# Patient Record
Sex: FEMALE | Race: WHITE | NOT HISPANIC OR LATINO | Employment: OTHER | URBAN - METROPOLITAN AREA
[De-identification: names, ages, dates, MRNs, and addresses within clinical notes are randomized per-mention and may not be internally consistent; named-entity substitution may affect disease eponyms.]

---

## 2019-04-09 ENCOUNTER — APPOINTMENT (OUTPATIENT)
Dept: WOUND CARE | Facility: HOSPITAL | Age: 74
End: 2019-04-09
Payer: COMMERCIAL

## 2019-04-09 PROCEDURE — 99213 OFFICE O/P EST LOW 20 MIN: CPT

## 2019-04-09 PROCEDURE — 97597 DBRDMT OPN WND 1ST 20 CM/<: CPT

## 2019-04-23 ENCOUNTER — APPOINTMENT (OUTPATIENT)
Dept: WOUND CARE | Facility: HOSPITAL | Age: 74
End: 2019-04-23
Payer: COMMERCIAL

## 2019-04-23 PROCEDURE — 99212 OFFICE O/P EST SF 10 MIN: CPT

## 2019-04-23 PROCEDURE — 97597 DBRDMT OPN WND 1ST 20 CM/<: CPT

## 2019-05-07 ENCOUNTER — APPOINTMENT (OUTPATIENT)
Dept: WOUND CARE | Facility: HOSPITAL | Age: 74
End: 2019-05-07
Payer: COMMERCIAL

## 2019-05-07 PROCEDURE — 99212 OFFICE O/P EST SF 10 MIN: CPT

## 2020-09-10 ENCOUNTER — TELEPHONE (OUTPATIENT)
Dept: OBGYN CLINIC | Facility: CLINIC | Age: 75
End: 2020-09-10

## 2020-09-10 ENCOUNTER — APPOINTMENT (OUTPATIENT)
Dept: RADIOLOGY | Facility: CLINIC | Age: 75
End: 2020-09-10
Payer: COMMERCIAL

## 2020-09-10 ENCOUNTER — OFFICE VISIT (OUTPATIENT)
Dept: OBGYN CLINIC | Facility: CLINIC | Age: 75
End: 2020-09-10
Payer: COMMERCIAL

## 2020-09-10 VITALS
SYSTOLIC BLOOD PRESSURE: 142 MMHG | TEMPERATURE: 98.6 F | DIASTOLIC BLOOD PRESSURE: 78 MMHG | HEIGHT: 63 IN | BODY MASS INDEX: 23.92 KG/M2 | HEART RATE: 80 BPM | WEIGHT: 135 LBS

## 2020-09-10 DIAGNOSIS — M54.12 RADICULOPATHY, CERVICAL REGION: Primary | ICD-10-CM

## 2020-09-10 DIAGNOSIS — M54.12 RADICULOPATHY, CERVICAL REGION: ICD-10-CM

## 2020-09-10 PROCEDURE — 72040 X-RAY EXAM NECK SPINE 2-3 VW: CPT

## 2020-09-10 PROCEDURE — 99204 OFFICE O/P NEW MOD 45 MIN: CPT | Performed by: FAMILY MEDICINE

## 2020-09-10 RX ORDER — SIMVASTATIN 40 MG
TABLET ORAL
COMMUNITY
Start: 2020-08-07

## 2020-09-10 RX ORDER — FLUOROMETHOLONE 0.1 %
SUSPENSION, DROPS(FINAL DOSAGE FORM)(ML) OPHTHALMIC (EYE)
COMMUNITY
Start: 2020-08-10

## 2020-09-10 RX ORDER — ATENOLOL 50 MG/1
TABLET ORAL
COMMUNITY
Start: 2020-08-07

## 2020-09-10 RX ORDER — CELECOXIB 200 MG/1
100 CAPSULE ORAL 2 TIMES DAILY
COMMUNITY
Start: 2020-09-09

## 2020-09-10 RX ORDER — PREDNISONE 10 MG/1
TABLET ORAL
Qty: 28 TABLET | Refills: 0 | Status: SHIPPED | OUTPATIENT
Start: 2020-09-10

## 2020-09-10 RX ORDER — LISINOPRIL 20 MG/1
TABLET ORAL
COMMUNITY
Start: 2020-08-07

## 2020-09-10 RX ORDER — HYDROCODONE BITARTRATE AND ACETAMINOPHEN 5; 325 MG/1; MG/1
1 TABLET ORAL EVERY 6 HOURS PRN
Qty: 15 TABLET | Refills: 0 | Status: SHIPPED | OUTPATIENT
Start: 2020-09-10

## 2020-09-10 RX ORDER — AMLODIPINE BESYLATE 2.5 MG/1
TABLET ORAL
COMMUNITY
Start: 2020-08-05

## 2020-09-10 RX ORDER — HYDROCODONE BITARTRATE AND ACETAMINOPHEN 5; 325 MG/1; MG/1
TABLET ORAL
COMMUNITY
Start: 2020-09-05

## 2020-09-10 RX ORDER — BETAMETHASONE DIPROPIONATE 0.5 MG/G
LOTION TOPICAL
COMMUNITY
Start: 2020-09-02

## 2020-09-10 NOTE — PATIENT INSTRUCTIONS
F/u 1 wk  Consider PT if no improvement  Begin prednisone taper  Most likely Cervical radiculopathy  Possible psoriatic arthritis but patient had no improvement with injection  (told she has bone on bone) Maybe need a few more days for steroids to work? Hold Celebrex and methylprednisolone  Inform Rheumatologist of treatment  Patient will bring XR to next appt  She will schedule with Ortho down near to home

## 2020-09-10 NOTE — PROGRESS NOTES
[facility] [address] [facility]  [address]  [id number] [id number] [phone]      Chief Complaint:  Chief Complaint   Patient presents with    Left Elbow - Pain       Vitals:  /78 (BP Location: Left arm, Patient Position: Sitting, Cuff Size: Standard)   Pulse 80   Temp 98 6 °F (37 °C) (Tympanic)   Ht 5' 3 25" (1 607 m)   Wt 61 2 kg (135 lb)   BMI 23 73 kg/m²     The following portions of the patient's history were reviewed and updated as appropriate: allergies, current medications, past family history, past medical history, past social history, past surgical history, and problem list       Subjective:   Patient ID: Carey Scott is a 66 y o  female  Here c/o L elbow pain  Pain started last Thursday- woke up with pain  Similar symptoms 1 year ago- given meloxicam Dr Neo Sanz- Rheum- referred her here  Symptoms improved  Started again  Went to ER on Sat- at [address]- XR, told XR showed arthritis  The night before she moved a bookcase  No other injury  Took meloxicam 15 mg which didn't help- caused ankles to swell  Taking T#3 from ER at night  Tylenol at night  Started Celebrex yesterday  Given injection in L elbow- no help  Hx of psoriatic arthritis on steroids  Pain radiating down to the fingers      Review of Systems   Constitutional: Negative for fatigue and fever  Respiratory: Negative for shortness of breath  Cardiovascular: Negative for chest pain  Gastrointestinal: Positive for nausea  Negative for abdominal pain  Genitourinary: Negative for dysuria  Musculoskeletal: Positive for arthralgias  Skin: Negative for rash and wound  Neurological: Negative for weakness and headaches         Objective:  Back Exam     Tenderness   The patient is experiencing tenderness in the cervical     Range of Motion   Extension: normal   Flexion: normal   Lateral bend right: normal   Lateral bend left: normal   Rotation right: normal   Rotation left: normal Comments:  Pos Spurling test   Shoulder exam- mild pain with movement  Left Elbow Exam     Tenderness   The patient is experiencing tenderness in the lateral epicondyle, medial epicondyle, radial capitellar joint and radial head  Range of Motion   Extension: abnormal   Flexion: abnormal   Pronation: abnormal   Supination: abnormal     Muscle Strength   Pronation:  5 and 4/5   Supination:  4/5     Tests   Tinel's sign (cubital tunnel): negative    Comments:  Entire elbow mild TTP, pain with ROM            Physical Exam  Vitals signs and nursing note reviewed  Constitutional:       Appearance: Normal appearance  She is well-developed  HENT:      Head: Normocephalic  Mouth/Throat:      Mouth: Mucous membranes are moist    Eyes:      Extraocular Movements: Extraocular movements intact  Neck:      Musculoskeletal: Normal range of motion  Cardiovascular:      Rate and Rhythm: Normal rate and regular rhythm  Heart sounds: Normal heart sounds  Pulmonary:      Effort: Pulmonary effort is normal       Breath sounds: Normal breath sounds  Abdominal:      General: Bowel sounds are normal       Palpations: Abdomen is soft  Musculoskeletal:         General: Tenderness and signs of injury present  Skin:     General: Skin is warm and dry  Neurological:      General: No focal deficit present  Mental Status: She is alert and oriented to person, place, and time  Psychiatric:         Mood and Affect: Mood normal          Behavior: Behavior normal          Thought Content: Thought content normal          I have personally reviewed pertinent films in PACS and my interpretation is mod/severe DJD  no fx  Assessment/Plan:  Assessment/Plan   Diagnoses and all orders for this visit:    Radiculopathy, cervical region  -     XR spine cervical 2 or 3 vw injury; Future  -     predniSONE 10 mg tablet; Take 7T PO x 1 day, then take 6T PO x 1 day, and continue to decrease  by 1T until finished  Quantity-28  -     HYDROcodone-acetaminophen (NORCO) 5-325 mg per tablet; Take 1 tablet by mouth every 6 (six) hours as needed for painMax Daily Amount: 4 tablets    Other orders  -     amLODIPine (NORVASC) 2 5 mg tablet  -     atenolol (TENORMIN) 50 mg tablet  -     betamethasone dipropionate 0 05 % lotion; apply to eyelids as needed  -     celecoxib (CeleBREX) 200 mg capsule; TAKE 1 CAPSULE BY MOUTH TWICE DAILY AS NEEDED WITH FOOD  -     fluorometholone (FML) 0 1 % ophthalmic suspension; instill 1 drop into BOTH eyes ONCE A DAY  -     HYDROcodone-acetaminophen (NORCO) 5-325 mg per tablet  -     lisinopril (ZESTRIL) 20 mg tablet  -     simvastatin (ZOCOR) 40 mg tablet        Return in about 1 week (around 9/17/2020), or schedule appt closer to patients home-Meng Rocha MD

## 2020-09-17 ENCOUNTER — OFFICE VISIT (OUTPATIENT)
Dept: OBGYN CLINIC | Facility: CLINIC | Age: 75
End: 2020-09-17
Payer: COMMERCIAL

## 2020-09-17 VITALS
BODY MASS INDEX: 24.45 KG/M2 | TEMPERATURE: 97.8 F | HEIGHT: 63 IN | SYSTOLIC BLOOD PRESSURE: 138 MMHG | DIASTOLIC BLOOD PRESSURE: 82 MMHG | WEIGHT: 138 LBS

## 2020-09-17 DIAGNOSIS — M12.522: Primary | ICD-10-CM

## 2020-09-17 DIAGNOSIS — G89.29 ELBOW PAIN, CHRONIC, LEFT: ICD-10-CM

## 2020-09-17 DIAGNOSIS — M25.522 ELBOW PAIN, CHRONIC, LEFT: ICD-10-CM

## 2020-09-17 PROCEDURE — 99213 OFFICE O/P EST LOW 20 MIN: CPT | Performed by: ORTHOPAEDIC SURGERY
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
## 2020-09-17 NOTE — ASSESSMENT & PLAN NOTE
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

## 2020-10-19 ENCOUNTER — OFFICE VISIT (OUTPATIENT)
Dept: OBGYN CLINIC | Facility: CLINIC | Age: 75
End: 2020-10-19
Payer: COMMERCIAL

## 2020-10-19 VITALS
SYSTOLIC BLOOD PRESSURE: 122 MMHG | WEIGHT: 140.2 LBS | DIASTOLIC BLOOD PRESSURE: 78 MMHG | BODY MASS INDEX: 24.84 KG/M2 | HEIGHT: 63 IN

## 2020-10-19 DIAGNOSIS — M12.522: ICD-10-CM

## 2020-10-19 PROCEDURE — 99213 OFFICE O/P EST LOW 20 MIN: CPT | Performed by: ORTHOPAEDIC SURGERY

## 2020-10-20 ENCOUNTER — TELEPHONE (OUTPATIENT)
Dept: OBGYN CLINIC | Facility: HOSPITAL | Age: 75
End: 2020-10-20

## 2024-07-22 ENCOUNTER — OFFICE VISIT (OUTPATIENT)
Dept: OBGYN CLINIC | Facility: CLINIC | Age: 79
End: 2024-07-22
Payer: COMMERCIAL

## 2024-07-22 ENCOUNTER — APPOINTMENT (OUTPATIENT)
Dept: RADIOLOGY | Facility: CLINIC | Age: 79
End: 2024-07-22
Payer: COMMERCIAL

## 2024-07-22 VITALS — BODY MASS INDEX: 24.64 KG/M2 | HEIGHT: 63 IN

## 2024-07-22 DIAGNOSIS — M12.522: ICD-10-CM

## 2024-07-22 DIAGNOSIS — M12.522: Primary | ICD-10-CM

## 2024-07-22 PROCEDURE — 99204 OFFICE O/P NEW MOD 45 MIN: CPT | Performed by: ORTHOPAEDIC SURGERY

## 2024-07-22 PROCEDURE — 73080 X-RAY EXAM OF ELBOW: CPT

## 2024-07-22 RX ORDER — TRAMADOL HYDROCHLORIDE 50 MG/1
50 TABLET ORAL
COMMUNITY
Start: 2024-05-22

## 2024-07-22 RX ORDER — ATORVASTATIN CALCIUM 40 MG/1
40 TABLET, FILM COATED ORAL EVERY 24 HOURS
COMMUNITY

## 2024-07-22 RX ORDER — CHLORHEXIDINE GLUCONATE ORAL RINSE 1.2 MG/ML
15 SOLUTION DENTAL ONCE
OUTPATIENT
Start: 2024-07-22 | End: 2024-07-22

## 2024-07-22 NOTE — PROGRESS NOTES
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  She will be on antibiotics prior to the procedure.  We discussed that she will be in a splint for some time after the procedure to encourage appropriate healing.  Then she would begin some gentle range of motion.  She would have a 3 pound lifting restriction for the rest of her life.  We will need to have her obtain a medical clearance letter prior to the procedure.  The patient wishes to proceed with surgery.  Consent was obtained today.    We also discussed significant risks including infection, wound healing, triceps healing, instability of the elbow prosthetic.    Follow Up:  After Surgery    To Do Next Visit:  Post-op evaluation    General Discussions:     Osteoarthritis:  The anatomy and physiology of osteoarthritis was discussed with the patient today in the office.  Deterioration of the articular cartilage eventually leads to altered mobility at the joint, resulting in joint subluxation, osteophyte formation, cystic changes, as well as subchondral sclerosis.  Eventually, pain, limited mobility, and compensatory hypermobility at surrounding joints may develop.  While normal activity and usage of the joint may provide a painful experience to the patient, this typically does not result in damage to the limb.  Treatment options include splints to decreased joint edema, pain, and inflammation.  Therapy exercises to strengthen the surrounding musculature may relieve pain, but do not alter the overall continued development of osteoarthritis.  Oral medications, topical medications, corticosteroid injections may decrease pain and increase overall function.  Eventually, some patients may require surgical intervention.     Operative Discussions:     Standard Consent: The risks and benefits of the procedure were explained  to the patient, which include, but are not limited to: Bleeding, infection, recurrence, pain, scar, damage to tendons, damage to nerves, and damage to blood vessels, failure to give desired results and complications related to anesthesia.  These risks, along with alternative conservative treatment options, and postoperative protocols were voiced back and understood by the patient.  All questions were answered to the patient's satisfaction.  The patient agrees to comply with a standard postoperative protocol, and is willing to proceed.  Education was provided via written and auditory forms.  There were no barriers to learning. Written handouts regarding wound care, incision and scar care, and general preoperative information was provided to the patient.  Prior to surgery, the patient may be requested to stop all anti-inflammatory medications.  Prophylactic aspirin, Plavix, and Coumadin may be allowed to be continued.  Medications including vitamin E., ginkgo, and fish oil are requested to be stopped approximately one week prior to surgery.  Hypertensive medications and beta blockers, if taken, should be continued.    _____________________________________________________  CHIEF COMPLAINT:  Chief Complaint   Patient presents with    Left Elbow - Pain     Last seen 10/19/2020         SUBJECTIVE:  Paula Handley is a 78 y.o. female who presents for evaluation of her left elbow pain.  She had seen us 4 years ago where he was diagnosed with left elbow arthritis.  She did not wish to do anything for her condition at that time, but now she states that her pain has become much more severe and she wishes to discuss elbow replacement surgery.  Her symptoms prevent her from doing her ADL's.  She has a remote history of a supracondylar 10 years ago.  Radiation: None  Previous Treatments: steroid injections, NSAIDs, and Tylenol with only partial relief  Handedness: right    PAST MEDICAL HISTORY:  Past Medical History:   Diagnosis  Date    Arthritis     Hyperlipemia     Hypertension        PAST SURGICAL HISTORY:  Past Surgical History:   Procedure Laterality Date    CORNEAL TRANSPLANT         FAMILY HISTORY:  Family History   Problem Relation Age of Onset    Hypertension Mother     Cancer Mother     Hypertension Father     Cancer Sister        SOCIAL HISTORY:  Social History     Tobacco Use    Smoking status: Never    Smokeless tobacco: Never   Vaping Use    Vaping status: Never Used   Substance Use Topics    Alcohol use: Never    Drug use: Never       MEDICATIONS:    Current Outpatient Medications:     amLODIPine (NORVASC) 2.5 mg tablet, , Disp: , Rfl:     atenolol (TENORMIN) 50 mg tablet, , Disp: , Rfl:     betamethasone dipropionate 0.05 % lotion, apply to eyelids as needed, Disp: , Rfl:     celecoxib (CeleBREX) 200 mg capsule, Take 100 mg by mouth 2 (two) times a day , Disp: , Rfl:     fluorometholone (FML) 0.1 % ophthalmic suspension, instill 1 drop into BOTH eyes ONCE A DAY, Disp: , Rfl:     lisinopril (ZESTRIL) 20 mg tablet, , Disp: , Rfl:     traMADol (ULTRAM) 50 mg tablet, Take 50 mg by mouth Every 6 hours, Disp: , Rfl:     atorvastatin (LIPITOR) 40 mg tablet, Take 40 mg by mouth every 24 hours, Disp: , Rfl:     HYDROcodone-acetaminophen (NORCO) 5-325 mg per tablet, , Disp: , Rfl:     HYDROcodone-acetaminophen (NORCO) 5-325 mg per tablet, Take 1 tablet by mouth every 6 (six) hours as needed for painMax Daily Amount: 4 tablets (Patient not taking: Reported on 7/22/2024), Disp: 15 tablet, Rfl: 0    predniSONE 10 mg tablet, Take 7T PO x 1 day, then take 6T PO x 1 day, and continue to decrease  by 1T until finished. Quantity-28 (Patient not taking: Reported on 7/22/2024), Disp: 28 tablet, Rfl: 0    simvastatin (ZOCOR) 40 mg tablet, , Disp: , Rfl:     ALLERGIES:  Allergies   Allergen Reactions    Aleve [Naproxen]     Penicillins        REVIEW OF SYSTEMS:  Pertinent items are noted in HPI.  A comprehensive review of systems was  "negative.    LABS:  HgA1c:   Lab Results   Component Value Date    HGBA1C 5.5 09/23/2021     BMP:   Lab Results   Component Value Date    CALCIUM 9.9 05/29/2024    K 4.0 05/29/2024    CO2 26.6 05/29/2024    BUN 18 05/29/2024    CREATININE 1.10 05/29/2024         _____________________________________________________  PHYSICAL EXAMINATION:  Vital signs: Ht 5' 3.25\" (1.607 m)   BMI 24.64 kg/m²   General: well developed and well nourished, alert, oriented times 3, and appears comfortable  Psychiatric: Normal  HEENT: Trachea Midline, No torticollis  Cardiovascular: No discernable arrhythmia  Pulmonary: No wheezing or stridor  Abdomen: No rebound or guarding  Extremities: No peripheral edema  Skin: No masses, erythema, lacerations, fluctation, ulcerations  Neurovascular: Sensation Intact to the Median, Ulnar, Radial Nerve, Motor Intact to the Median, Ulnar, Radial Nerve, and Pulses Intact    MUSCULOSKELETAL EXAMINATION:  LEFT SIDE:  Elbow:  Crepitation palpable with all elbow ROM.  ROM:  Extension - 40 degrees; Flexion - 130 degrees.  Full pronation and supination.  Negative Karely's over the ulnar nerve.  Full composite fist.  Motor intact R/M/U/ain/pin.  No instability noted, no skin changes, intact triceps, intact biceps.    _____________________________________________________  STUDIES REVIEWED:  Images were reviewed in PACS by Dr. Christianson and demonstrate: Left Elbow X-rays - malunited supracondylar fracture with tricompartmental arthritis.      PROCEDURES PERFORMED:  Procedures  No Procedures performed today         Scribe Attestation      I,:  Marycruz Benitez PA-C am acting as a scribe while in the presence of the attending physician.:       I,:  Alec Christianson MD personally performed the services described in this documentation    as scribed in my presence.:             Scribe Attestation      I,:  Marycruz Benitez PA-C am acting as a scribe while in the presence of the attending physician.:       I,:  " Alec Christianson MD personally performed the services described in this documentation    as scribed in my presence.:

## 2024-07-30 DIAGNOSIS — Z01.818 PREOP TESTING: Primary | ICD-10-CM

## 2024-08-07 ENCOUNTER — TELEPHONE (OUTPATIENT)
Dept: ANESTHESIOLOGY | Facility: CLINIC | Age: 79
End: 2024-08-07

## 2024-08-07 DIAGNOSIS — Z01.89 ENCOUNTER FOR GERIATRIC ASSESSMENT: Primary | ICD-10-CM

## 2024-08-12 ENCOUNTER — TELEPHONE (OUTPATIENT)
Age: 79
End: 2024-08-12

## 2024-08-12 NOTE — TELEPHONE ENCOUNTER
Called patient she is keeping PAT appt as scheduled she was confused on date that she was not available.

## 2024-08-12 NOTE — TELEPHONE ENCOUNTER
Caller: Patient     Doctor: Meghan     Reason for call: Patient needs to reschedule PAT appt on 8/15. She will not be available that day  Please call patient to coordinate     Call back#: [phone]

## 2024-08-23 ENCOUNTER — TELEMEDICINE (OUTPATIENT)
Dept: ANESTHESIOLOGY | Facility: CLINIC | Age: 79
End: 2024-08-23
Payer: COMMERCIAL

## 2024-08-23 DIAGNOSIS — M12.522: Primary | ICD-10-CM

## 2024-08-23 DIAGNOSIS — I48.0 PAF (PAROXYSMAL ATRIAL FIBRILLATION) (HCC): ICD-10-CM

## 2024-08-23 DIAGNOSIS — Z01.89 ENCOUNTER FOR GERIATRIC ASSESSMENT: ICD-10-CM

## 2024-08-23 PROBLEM — I48.91 ATRIAL FIBRILLATION (HCC): Status: ACTIVE | Noted: 2024-08-23

## 2024-08-23 PROCEDURE — 99213 OFFICE O/P EST LOW 20 MIN: CPT | Performed by: NURSE PRACTITIONER

## 2024-08-23 NOTE — PROGRESS NOTES
[facility]      Reason for Visit: Pre-operative Evaluation for Risk Stratification and Optimization    Patient ID: Paula Handley is a 78 y.o. 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]   Location:  OR ROOM 02 / [facility]   Surgeons: Alec Christianson MD         Pre-operative Medical Evaluation for planned surgery  Recommendations as listed in PLAN section below      Problem List Items Addressed This Visit       Traumatic arthritis of elbow, left - Primary  Fell 4 years ago   Has been monitoring and treating elbow pain over 4 years   Now electing surgery   Seen for SO   Seen for geriatrics  Received [facility]    Started BEST   At risk for post-op JOHN - yes  At risk for post-op SSI- no   BEST   Breathing- instructed to exercise lungs prior to surgery via deep breathing   Eat- discussed increasing protein intake prior to surgery   Sleep/stress- encouraged 8-10 sleep @ night, stress reduction, avoid sick contacts and handwashing   Train- encouraged to remain active        PAF (paroxysmal atrial fibrillation) (HCC)  4 years ago right with COVID   Blood clot from afib   Stable now   No concerns                 Plan:     2. Preoperative Medication Management Review performed by PAT nursing  YES    3. Patient requires further consultation with:   No Consults Required had [facility]     4. Discharge Planning / Barriers to Discharge  none identified - patients has post discharge therapy plan in place, transportation arranged for discharge day, adequate family support at home to assist with discharge to home.        PRE-OP  "WORKSHEET DATA  Assessment of Pre-Operative Risks     SOC Quality Hard Stops:    SSI Risk:  BMI (>40) : Estimated body mass index is 21.58 kg/m² as calculated from the following:    Height as of 8/15/24: 5' 2\" (1.575 m).    Weight as of 8/15/24: 53.5 kg (118 lb). (if >than 40 then offer weight management)  -Weight management consult No    HbA1c (<8.0) :   Lab Results   Component Value Date    HGBA1C 5.5 09/23/2021    (>8.0 recommend Endocrine consult)   -endocrine consult indicated No    Tobacco use:  No     -lifestyle management consult No    SOC Anemia review:  Hgb ( >11): No results found for: \"HGB\"   -IV venofer indicated  No    JOHN risk:  GFR (>60) (Less then 45 = Nephrology consult):    Lab Results   Component Value Date    EGFR 50.9 (L) 05/29/2024    EGFR 47.79 05/29/2024    EGFR 48.81 11/07/2023      -Nephrology consult indicated No    GERIATRIC ASSESSMENT Yes  Mini-Cognitive Screen (MCI) score <2    inpatient geriatric consult No   -ordered delirium precautions on admit Yes  Depression Screen   Falls (yes within the last 6 months)   -Fall precautions ordered on admit Yes  Mini Nutritional Assessment (MNA) (score <12)   -Estimated body mass index is 21.58 kg/m² as calculated from the following:    Height as of 8/15/24: 5' 2\" (1.575 m).    Weight as of 8/15/24: 53.5 kg (118 lb).   -order inpatient nutrition evaluation No      Functional capacity:   METS (<4) 7   -<4 MET send to SOC DOC for evaluation     Carb Drinks given by SOC   No          History of Present Illness:     Paula Handley is a 78 y.o. female who presents to the office today for a preoperative screening. They are electing to undergo planned procedure with an understanding that all surgery has inherent risk. Today they present for preoperative risk assessment and recommendations for optimization in preparation for surgery.  We have reviewed their past medical/surgical/social history as listed below, their most recent labs and EKG, and labs were " also reviewed and recommendations are as below.       ROS:      Denies fevers and denies chills  Denies congestion and denies sore throat  Denies chest pain  Denies palpitations  Denies shortness of breath  Denies abdominal pain  Denies nausea, vomiting, and diarrhea  Denies any issues with their skin... example NO open wounds or sores  Denies rashes  Denies difficulty urinating  Denies any issues with their urine... example a dark color or an odor  Denies dizziness  Denies headaches  Denies confusion and denies hallucinations    Admits to being in well health today       Objective:        Fall on elbow    4 monitoring   Pain     There were no vitals taken for this visit.      The following portions of the patient's history were reviewed and updated as appropriate: allergies, current medications, past family history, past medical history, past social history, past surgical history and problem list.     Past History:       Past Medical History:   Diagnosis Date    Arthritis     Hyperlipemia     Hypertension     Past Surgical History:   Procedure Laterality Date    CARDIAC ELECTROPHYSIOLOGY MAPPING AND ABLATION      CORNEAL TRANSPLANT      FOOT FRACTURE SURGERY Right           Social History     Tobacco Use    Smoking status: Never    Smokeless tobacco: Never   Vaping Use    Vaping status: Never Used   Substance Use Topics    Alcohol use: Never    Drug use: Never     Family History   Problem Relation Age of Onset    Hypertension Mother     Cancer Mother     Hypertension Father     Cancer Sister           Allergies:     Allergies   Allergen Reactions    Aleve [Naproxen] Itching     swelling    Bactrim [Sulfamethoxazole-Trimethoprim] Vomiting    Bupropion Lightheadedness    Buspirone Diarrhea    Cefaclor Itching    Cephalexin Itching    Doxycycline Nausea Only    Nsaids Itching     Swelling     Penicillins Hives     Swelling     Sertraline Diarrhea    Venlafaxine Itching        Current Medications:     Current  Outpatient Medications   Medication Instructions    acetaminophen (TYLENOL) 650 mg, Oral, Every 6 hours PRN    amLODIPine (NORVASC) 2.5 mg tablet 2 times daily    ascorbic acid (VITAMIN C) 250 mg, Oral, Daily    atorvastatin (LIPITOR) 40 mg, Oral, Every evening    Calcium Carb-Cholecalciferol (CALCIUM 600 + D PO) Oral, Daily    Coenzyme Q10 (Co Q 10) 100 MG CAPS Oral, Daily    escitalopram (LEXAPRO) 10 mg, Oral, Daily    fluorometholone (FML) 0.1 % ophthalmic suspension instill 1 drop into BOTH eyes ONCE A DAY    furosemide (LASIX) 20 mg, Oral, Every morning    latanoprost (XALATAN) 0.005 % ophthalmic solution 1 drop, Daily at bedtime    methylPREDNISolone (MEDROL) 2 mg, Oral, Daily    Probiotic Product (PROBIOTIC DAILY PO) Oral, Daily    Stelara 45 mg, Subcutaneous, Every 3 months    traMADol (ULTRAM) 25 mg, Oral, Every 6 hours PRN    valsartan (DIOVAN) 80 mg, Oral, 2 times daily    Vitamin D, Cholecalciferol, 25 MCG (1000 UT) CAPS Oral, Daily        Pre-Op Data Reviewed:       Laboratory Results: I have personally reviewed the pertinent laboratory results/reports     EKG:I have personally reviewed pertinent reports.  . I personally reviewed and interpreted available tracings in the electronic medical record    No results found for this or any previous visit (from the past 4464 hour(s)).    Time of visit including pre-visit chart review, visit and post-visit coordination of plan and care , review of pre-surgical lab work, preparation and time spent documenting note in electronic medical record, time spent face-to-face in physical examination answering patient questions by care team 25 minutes               [facility]

## 2024-08-23 NOTE — PROGRESS NOTES
[facility]  CONSULT       Patient has the ability to take their vital signs at home NO  Allergies reviewed today   PMH reviewed today   Medications reviewed today     See Geriatric Assessment below...  Cognitive Assessment:   CAM:   TUG <15 sec:  Falls (last 6 months): no  Hand  score:  -Attempt 1:  -Attempt 2:  -Attempt 3:  Jayant Total Score: 22  PHQ- 9 Depression Scale: 0  Nutrition Assessment Score:13  METS: 7.34  Incentive Spirometry Level:   Health goals:  -What are your overall health goals? Stay exercising    -What brings you strength?     -What activities are important to you? Read, shop     As always we discussed having your BEST surgery, and BEST recovery.  Surgery goals reviewed today.      Breathing exercises   Patient was encouraged to begin lung exercises today.  This could be accomplished through deep breathing and cough exercises.     Eating/nutrition   Encouraged patient to increase oral protein intake prior to surgery.  This can be accomplished by consuming chicken, fish, tuna fish, cottage cheese, cheese, eggs, Greek yogurt, and protein shakes as needed.  I encouraged use of protein shakes such ENLIVE.  I also recommended making your own protein shakes with protein powder.   Sleep/Stress management  Patient was encouraged to rest their body prior to surgery.  Encouraged attempting to get 8 hours of sleep at night.  Avoid stress.  Avoid sick contacts.  Encouraged to find a relaxing hobby such as reading, meditation, listening to music.  Training exercises  Patient was encouraged to remain active as possible.  Today bilateral lower extremity generic exercises were taught for muscle strengthening and balance.  All exercises to be done sitting down.

## 2024-09-03 ENCOUNTER — TELEPHONE (OUTPATIENT)
Age: 79
End: 2024-09-03

## 2024-09-03 NOTE — TELEPHONE ENCOUNTER
Caller: Paula     Doctor: Luzma    Reason for call: Sx Scheduled on Thursday 9/5/2024 - Sunday she found out she had a UTI and is on antibiotic's she will be on her 4th day on Thursday and she wants to make sure she can still proceed with surgery     Call back#: [phone]

## 2024-10-28 ENCOUNTER — TELEPHONE (OUTPATIENT)
Dept: OBGYN CLINIC | Facility: CLINIC | Age: 79
End: 2024-10-28

## 2024-10-28 ENCOUNTER — TELEPHONE (OUTPATIENT)
Age: 79
End: 2024-10-28

## 2024-10-28 NOTE — TELEPHONE ENCOUNTER
Spoke to Paula, Scheduled PO apt in [place] on 12/20 at 8:30 am. Patient was not happy to travel to [place] however she agreed since she is unable to get in the week of [place] due to providers schedule.

## 2024-10-28 NOTE — TELEPHONE ENCOUNTER
Caller: Paula    Doctor: Meghan    Reason for call: needs a new post op visit surgery is 12/12    Call back#: [phone]

## 2024-12-11 ENCOUNTER — TELEPHONE (OUTPATIENT)
Age: 79
End: 2024-12-11

## 2024-12-11 NOTE — TELEPHONE ENCOUNTER
Caller: Daughter Evon Solorio    Doctor: Meghan    Reason for call: Called in upset. Patient spoke to someone in PAT this morning who confirmed surgery and when to be there. Ten minutes later they received a call that they did not have the correct size elbow for patient and would have to cancel. This is the second time surgery had to be rescheduled. Patient is in a lot of pain and they would like this resolved as soon as possible    Call back#: [phone]

## 2024-12-12 NOTE — TELEPHONE ENCOUNTER
I spoke with Paula last night and explained that we were made aware very last minute by the vendor that they did not have the correct size elbow implant for her and that the implants were on back order and they did not know when they would be available. When I spoke with Dr. Christianson, he did give me another surgeon recommendation in case Paula wanted to see someone else based on this last minute cancellation. Paula did take the other surgeon's information in case she wanted to contact their office.    --     I spoke with Evon Solorio this morning and explained the situation again to her. She understands that this does not reflect on Dr. Christianson or [facility]. She is asking if there is anything that Dr. Christianson can do to help expedite the process to get the correct implants, or if there is another vendor that he can use for this? This issue with her elbow is impacting her day-to-day life and activities. They would like to stay with Dr. Christianson and have the surgery once the parts are received. BUT she is asking -- does Dr. Christianson think, at this point, is it faster for them to go through this other surgeon instead of waiting for our vendor to have the correct parts?    Please advise as soon as possible. Thank you!